# Patient Record
Sex: FEMALE | Race: WHITE
[De-identification: names, ages, dates, MRNs, and addresses within clinical notes are randomized per-mention and may not be internally consistent; named-entity substitution may affect disease eponyms.]

---
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
## 2017-01-12 NOTE — EKG REPORT
SEVERITY:- OTHERWISE NORMAL ECG -

SINUS RHYTHM

ATRIAL PREMATURE COMPLEX

:

Confirmed by: Mally Chavez MD 12-Jan-2017 09:43:32

## 2019-06-03 ENCOUNTER — HOSPITAL ENCOUNTER (EMERGENCY)
Dept: HOSPITAL 62 - ER | Age: 44
LOS: 1 days | Discharge: HOME | End: 2019-06-04
Payer: COMMERCIAL

## 2019-06-03 DIAGNOSIS — R06.02: ICD-10-CM

## 2019-06-03 DIAGNOSIS — N39.0: ICD-10-CM

## 2019-06-03 DIAGNOSIS — R00.2: ICD-10-CM

## 2019-06-03 DIAGNOSIS — D64.9: Primary | ICD-10-CM

## 2019-06-03 DIAGNOSIS — R53.83: ICD-10-CM

## 2019-06-03 PROCEDURE — 86850 RBC ANTIBODY SCREEN: CPT

## 2019-06-03 PROCEDURE — 87088 URINE BACTERIA CULTURE: CPT

## 2019-06-03 PROCEDURE — P9016 RBC LEUKOCYTES REDUCED: HCPCS

## 2019-06-03 PROCEDURE — 99283 EMERGENCY DEPT VISIT LOW MDM: CPT

## 2019-06-03 PROCEDURE — 81001 URINALYSIS AUTO W/SCOPE: CPT

## 2019-06-03 PROCEDURE — 86901 BLOOD TYPING SEROLOGIC RH(D): CPT

## 2019-06-03 PROCEDURE — 86920 COMPATIBILITY TEST SPIN: CPT

## 2019-06-03 PROCEDURE — 36430 TRANSFUSION BLD/BLD COMPNT: CPT

## 2019-06-03 PROCEDURE — 36415 COLL VENOUS BLD VENIPUNCTURE: CPT

## 2019-06-03 PROCEDURE — 87086 URINE CULTURE/COLONY COUNT: CPT

## 2019-06-03 PROCEDURE — 85025 COMPLETE CBC W/AUTO DIFF WBC: CPT

## 2019-06-03 PROCEDURE — 86900 BLOOD TYPING SEROLOGIC ABO: CPT

## 2019-06-03 PROCEDURE — 81025 URINE PREGNANCY TEST: CPT

## 2019-06-03 PROCEDURE — 80053 COMPREHEN METABOLIC PANEL: CPT

## 2019-06-04 VITALS — DIASTOLIC BLOOD PRESSURE: 70 MMHG | SYSTOLIC BLOOD PRESSURE: 133 MMHG

## 2019-06-04 LAB
%HYPO/RBC NFR BLD AUTO: (no result) %
%HYPO/RBC NFR BLD AUTO: (no result) %
ABSOLUTE LYMPHOCYTES# (MANUAL): 1 10^3/UL (ref 0.5–4.7)
ABSOLUTE MONOCYTES # (MANUAL): 0.6 10^3/UL (ref 0.1–1.4)
ABSOLUTE NEUTROPHILS# (MANUAL): 6.1 10^3/UL (ref 1.7–8.2)
ADD MANUAL DIFF: (no result)
ADD MANUAL DIFF: YES
ALBUMIN SERPL-MCNC: 4.9 G/DL (ref 3.5–5)
ALP SERPL-CCNC: 64 U/L (ref 38–126)
ALT SERPL-CCNC: 27 U/L (ref 9–52)
ANION GAP SERPL CALC-SCNC: 12 MMOL/L (ref 5–19)
ANISOCYTOSIS BLD QL SMEAR: (no result)
ANISOCYTOSIS BLD QL SMEAR: (no result)
APPEARANCE UR: (no result)
APTT PPP: YELLOW S
AST SERPL-CCNC: 26 U/L (ref 14–36)
BASOPHILS # BLD AUTO: 0.1 10^3/UL (ref 0–0.2)
BASOPHILS NFR BLD AUTO: 2.1 % (ref 0–2)
BASOPHILS NFR BLD MANUAL: 0 % (ref 0–2)
BILIRUB DIRECT SERPL-MCNC: 0.2 MG/DL (ref 0–0.4)
BILIRUB SERPL-MCNC: 0.3 MG/DL (ref 0.2–1.3)
BILIRUB UR QL STRIP: NEGATIVE
BUN SERPL-MCNC: 12 MG/DL (ref 7–20)
CALCIUM: 10.1 MG/DL (ref 8.4–10.2)
CHLORIDE SERPL-SCNC: 102 MMOL/L (ref 98–107)
CO2 SERPL-SCNC: 27 MMOL/L (ref 22–30)
DACRYOCYTES BLD QL SMEAR: SLIGHT
EOSINOPHIL # BLD AUTO: 0.1 10^3/UL (ref 0–0.6)
EOSINOPHIL NFR BLD AUTO: 1.8 % (ref 0–6)
EOSINOPHIL NFR BLD MANUAL: 4 % (ref 0–6)
ERYTHROCYTE [DISTWIDTH] IN BLOOD BY AUTOMATED COUNT: 22.4 % (ref 11.5–14)
ERYTHROCYTE [DISTWIDTH] IN BLOOD BY AUTOMATED COUNT: 29.6 % (ref 11.5–14)
GLUCOSE SERPL-MCNC: 93 MG/DL (ref 75–110)
GLUCOSE UR STRIP-MCNC: NEGATIVE MG/DL
HCT VFR BLD CALC: 26.4 % (ref 36–47)
HCT VFR BLD CALC: 27.2 % (ref 36–47)
HGB BLD-MCNC: 7.5 G/DL (ref 12–15.5)
HGB BLD-MCNC: 8 G/DL (ref 12–15.5)
KETONES UR STRIP-MCNC: NEGATIVE MG/DL
LYMPHOCYTES # BLD AUTO: 1.8 10^3/UL (ref 0.5–4.7)
LYMPHOCYTES NFR BLD AUTO: 30.6 % (ref 13–45)
MCH RBC QN AUTO: 15.8 PG (ref 27–33.4)
MCH RBC QN AUTO: 17.5 PG (ref 27–33.4)
MCHC RBC AUTO-ENTMCNC: 28.4 G/DL (ref 32–36)
MCHC RBC AUTO-ENTMCNC: 29.4 G/DL (ref 32–36)
MCV RBC AUTO: 56 FL (ref 80–97)
MCV RBC AUTO: 59 FL (ref 80–97)
MONOCYTES # BLD AUTO: 0.6 10^3/UL (ref 0.1–1.4)
MONOCYTES % (MANUAL): 7 % (ref 3–13)
MONOCYTES NFR BLD AUTO: 9.2 % (ref 3–13)
NEUTROPHILS # BLD AUTO: 3.4 10^3/UL (ref 1.7–8.2)
NEUTS SEG NFR BLD AUTO: 56.3 % (ref 42–78)
NITRITE UR QL STRIP: NEGATIVE
OVALOCYTES BLD QL SMEAR: (no result)
PATH REV BLD -IMP: (no result)
PH UR STRIP: 5 [PH] (ref 5–9)
PLATELET # BLD: 421 10^3/UL (ref 150–450)
PLATELET # BLD: 510 10^3/UL (ref 150–450)
PLATELET CLUMP BLD QL SMEAR: PRESENT
PLATELET COMMENT: (no result)
PLATELET COMMENT: ADEQUATE
POTASSIUM SERPL-SCNC: 4.2 MMOL/L (ref 3.6–5)
PROT SERPL-MCNC: 8.7 G/DL (ref 6.3–8.2)
PROT UR STRIP-MCNC: NEGATIVE MG/DL
RBC # BLD AUTO: 4.58 10^6/UL (ref 3.72–5.28)
RBC # BLD AUTO: 4.73 10^6/UL (ref 3.72–5.28)
SCHISTOCYTES BLD QL SMEAR: (no result)
SEGMENTED NEUTROPHILS % (MAN): 76 % (ref 42–78)
SODIUM SERPL-SCNC: 141.1 MMOL/L (ref 137–145)
SP GR UR STRIP: 1.02
STOMATOCYTES BLD QL SMEAR: (no result)
TARGETS BLD QL SMEAR: (no result)
TOTAL CELLS COUNTED % (AUTO): 100 %
TOTAL CELLS COUNTED BLD: 100
UROBILINOGEN UR-MCNC: 2 MG/DL (ref ?–2)
VARIANT LYMPHS NFR BLD MANUAL: 13 % (ref 13–45)
WBC # BLD AUTO: 6 10^3/UL (ref 4–10.5)
WBC # BLD AUTO: 8 10^3/UL (ref 4–10.5)

## 2019-06-04 NOTE — ER DOCUMENT REPORT
ED General





- General


Chief Complaint: Medical Complaint


Stated Complaint: ABNORMAL LABS


Time Seen by Provider: 06/04/19 03:15


Primary Care Provider: 


BOBBY BHAKTA FNP-C [Primary Care Provider] - Follow up as needed


TRAVEL OUTSIDE OF THE [place] IN LAST 30 DAYS: No





- HPI


Notes: 





Patient is a 43-year-old female that presents to the emergency department for 

chief complaint of anemia.


Patient reports history of chronic anemia from iron deficiency.  She has not 

been on iron supplementation in the last few years.  She states over the last 

year so she has had increased fatigue.  She also reports palpitations and 

dyspnea with light exertion and climbing stairs.  She was seen at her PCPs 

office today and was referred to the ER for blood transfusion.  She denies any 

active bleeding including menstrual cycle or blood in her stool.  She denies 

history of GI bleeding in the past.  She currently states that she feels tired 

but has no other issues.  She denies any associated chest pain, lightheadedness,

numbness, weakness and vision changes.  Patient denies recent illness fevers and

chills. 





Past Medical History: Iron deficiency anemia, anxiety





Past Surgical History: Reviewed in chart





Social History: Reviewed in chart





Family History: Reviewed and noncontributory for presenting illness





Allergies: Reviewed, see documented allergy list.








REVIEW OF SYSTEMS:





CONSTITUTIONAL : 





No fever





No chills





No diaphoresis





No recent illness 





fatigue





EENT:





No vision changes





No congestion





No sore throat  





CARDIOVASCULAR:





No chest pain





 palpitations





RESPIRATORY:





shortness of breath





No cough





No difficulty breathing





GASTROINTESTINAL: 





No abdominal pain





No nausea





No vomiting





No diarrhea





GENITOURINARY:





No dysuria





No hematuria





No difficulty urinating





MUSCULOSKELETAL:





No back pain





No leg pain





No arm pain





SKIN:  





No rashes





No lesions





LYMPHATIC: 





No swollen, enlarged glands.





NEUROLOGICAL: 





No lightheadedness





No headache





No weakness





No paresthesias





PSYCHIATRIC:





No anxiety





No depression 








PHYSICAL EXAMINATION:





Vital signs reviewed, nursing noted reviewed.





GENERAL: Well-appearing, well-nourished and in no acute distress.





HEAD: Atraumatic, normocephalic.





EYES: Eyes appear normal, extraocular movements intact, sclera anicteric, 

conjunctiva are mildly pale





ENT: nares patent, oropharynx clear without exudates.  Moist mucous membranes.





NECK: Normal range of motion, supple without lymphadenopathy





LUNGS: Breath sounds clear to auscultation bilaterally and equal.  No wheezes 

rales or rhonchi.





HEART: Regular rate and rhythm without murmurs





ABDOMEN: Soft, nontender, normoactive bowel sounds.  No rebound, guarding, or 

rigidity. No masses appreciated.





EXTREMITIES: Nontender, good range of motion, no pitting or edema. 





NEUROLOGICAL: No focal neurological deficits. Moves all extremities 

spontaneously Motor and sensory grossly intact on exam.





PSYCH: Normal mood, normal affect.





SKIN: Warm, Dry, normal turgor, no rashes or lesions noted on exposed skin











- Related Data


Allergies/Adverse Reactions: 


                                        





No Known Allergies Allergy (Unverified 01/11/17 13:37)


   











Past Medical History





- Social History


Smoking Status: Unknown if Ever Smoked


Family History: Reviewed & Not Pertinent


Patient has suicidal ideation: No


Patient has homicidal ideation: No





- Past Medical History


Cardiac Medical History: 


   Denies: Hx Atrial Fibrillation, Hx Coronary Artery Disease


Renal/ Medical History: Denies: Hx Peritoneal Dialysis


Psychiatric Medical History: Reports: Hx Anxiety





Physical Exam





- Vital signs


Vitals: 


                                        











Temp Pulse Resp BP Pulse Ox


 


 98 F   68   19   116/66   100 


 


 06/04/19 01:49  06/04/19 01:49  06/04/19 01:49  06/04/19 01:49  06/04/19 01:49














Course





- Re-evaluation


Re-evalutation: 





06/04/19 03:55


Vitals reviewed.  Nursing notes reviewed.  Patient is well-appearing in no acute

distress.  She has no report of GI hemorrhage including black or bloody stools. 

Patient has a history of iron deficiency anemia and her symptoms have been 

Getting progressively worse over the last few months.  Her anemia is likely rela

donna to iron deficiency and is chronic in nature.  She is otherwise 

hemodynamically stable with no active bleeding.  Patient will be transfused 1 

unit packed red blood cells for her anemia.  She was given Keflex for her acute 

UTI.  Urine culture has been sent.  She is not septic or ill-appearing.  Current

plan to discharge home after blood transfusion for outpatient follow-up if no 

issues with transfusion arise.  Patient in agreement with this plan of care.





Laboratory











  06/04/19 06/04/19 06/04/19





  01:25 01:25 01:25


 


WBC  8.0  


 


RBC  4.73  


 


Hgb  7.5 L  


 


Hct  26.4 L  


 


MCV  56 L  


 


MCH  15.8 L  


 


MCHC  28.4 L  


 


RDW  22.4 H  


 


Plt Count  510 H  


 


Total Counted  100  


 


Seg Neutrophils %  Not Reportable  


 


Seg Neuts % (Manual)  76  


 


Lymphocytes %  Not Reportable  


 


Lymphocytes % (Manual)  13  


 


Monocytes %  Not Reportable  


 


Monocytes % (Manual)  7  


 


Eosinophils %  Not Reportable  


 


Eosinophils % (Manual)  4  


 


Basophils %  Not Reportable  


 


Basophils % (Manual)  0  


 


Absolute Neutrophils  Not Reportable  


 


Abs Neuts (Manual)  6.1  


 


Absolute Lymphocytes  Not Reportable  


 


Abs Lymphs (Manual)  1.0  


 


Absolute Monocytes  Not Reportable  


 


Abs Monocytes (Manual)  0.6  


 


Absolute Eosinophils  Not Reportable  


 


Absolute Eos (Manual)  0.3  


 


Absolute Basophils  Not Reportable  


 


Abs Basophils (Manual)  0.0  


 


Clumped Platelets  PRESENT  


 


Platelet Comment  INCREASED  


 


Hypochromasia  3+  


 


Anisocytosis  3+  


 


Target Cells  1+  


 


Tear Drop Cells  SLIGHT  


 


Ovalocytes  1+  


 


Stomatocytes  1+  


 


Sodium   141.1 


 


Potassium   4.2 


 


Chloride   102 


 


Carbon Dioxide   27 


 


Anion Gap   12 


 


BUN   12 


 


Creatinine   0.72 


 


Est GFR ( Amer)   > 60 


 


Est GFR (Non-Af Amer)   > 60 


 


Glucose   93 


 


Calcium   10.1 


 


Total Bilirubin   0.3 


 


Direct Bilirubin   0.2 


 


Neonat Total Bilirubin   Not Reportable 


 


Neonat Direct Bilirubin   Not Reportable 


 


Neonat Indirect Bili   Not Reportable 


 


AST   26 


 


ALT   27 


 


Alkaline Phosphatase   64 


 


Total Protein   8.7 H 


 


Albumin   4.9 


 


Urine Color    YELLOW


 


Urine Appearance    SLIGHTLY-CLOUDY


 


Urine pH    5.0


 


Ur Specific Gravity    1.024


 


Urine Protein    NEGATIVE


 


Urine Glucose (UA)    NEGATIVE


 


Urine Ketones    NEGATIVE


 


Urine Blood    NEGATIVE


 


Urine Nitrite    NEGATIVE


 


Urine Bilirubin    NEGATIVE


 


Urine Urobilinogen    2.0 H


 


Ur Leukocyte Esterase    LARGE H


 


Urine WBC (Auto)    17


 


Urine RBC (Auto)    3


 


Urine Bacteria (Auto)    TRACE


 


Squamous Epi Cells Auto    7


 


Urine Mucus (Auto)    RARE


 


Urine Ascorbic Acid    NEGATIVE


 


Urine HCG, Qual    NEGATIVE


 


Crossmatch   














  06/04/19





  02:20


 


WBC 


 


RBC 


 


Hgb 


 


Hct 


 


MCV 


 


MCH 


 


MCHC 


 


RDW 


 


Plt Count 


 


Total Counted 


 


Seg Neutrophils % 


 


Seg Neuts % (Manual) 


 


Lymphocytes % 


 


Lymphocytes % (Manual) 


 


Monocytes % 


 


Monocytes % (Manual) 


 


Eosinophils % 


 


Eosinophils % (Manual) 


 


Basophils % 


 


Basophils % (Manual) 


 


Absolute Neutrophils 


 


Abs Neuts (Manual) 


 


Absolute Lymphocytes 


 


Abs Lymphs (Manual) 


 


Absolute Monocytes 


 


Abs Monocytes (Manual) 


 


Absolute Eosinophils 


 


Absolute Eos (Manual) 


 


Absolute Basophils 


 


Abs Basophils (Manual) 


 


Clumped Platelets 


 


Platelet Comment 


 


Hypochromasia 


 


Anisocytosis 


 


Target Cells 


 


Tear Drop Cells 


 


Ovalocytes 


 


Stomatocytes 


 


Sodium 


 


Potassium 


 


Chloride 


 


Carbon Dioxide 


 


Anion Gap 


 


BUN 


 


Creatinine 


 


Est GFR ( Amer) 


 


Est GFR (Non-Af Amer) 


 


Glucose 


 


Calcium 


 


Total Bilirubin 


 


Direct Bilirubin 


 


Neonat Total Bilirubin 


 


Neonat Direct Bilirubin 


 


Neonat Indirect Bili 


 


AST 


 


ALT 


 


Alkaline Phosphatase 


 


Total Protein 


 


Albumin 


 


Urine Color 


 


Urine Appearance 


 


Urine pH 


 


Ur Specific Gravity 


 


Urine Protein 


 


Urine Glucose (UA) 


 


Urine Ketones 


 


Urine Blood 


 


Urine Nitrite 


 


Urine Bilirubin 


 


Urine Urobilinogen 


 


Ur Leukocyte Esterase 


 


Urine WBC (Auto) 


 


Urine RBC (Auto) 


 


Urine Bacteria (Auto) 


 


Squamous Epi Cells Auto 


 


Urine Mucus (Auto) 


 


Urine Ascorbic Acid 


 


Urine HCG, Qual 


 


Crossmatch  See Detail














- Vital Signs


Vital signs: 


                                        











Temp Pulse Resp BP Pulse Ox


 


 98.2 F   72   18   133/70 H  98 


 


 06/04/19 11:01  06/04/19 06:50  06/04/19 11:01  06/04/19 11:01  06/04/19 11:01














- Laboratory


Result Diagrams: 


                                 06/04/19 09:32





                                 06/04/19 01:25


Laboratory results interpreted by me: 


                                        











  06/04/19 06/04/19 06/04/19





  01:25 01:25 01:25


 


Hgb  7.5 L  


 


Hct  26.4 L  


 


MCV  56 L  


 


MCH  15.8 L  


 


MCHC  28.4 L  


 


RDW  22.4 H  


 


Plt Count  510 H  


 


Basophils %   


 


Total Protein   8.7 H 


 


Urine Urobilinogen    2.0 H


 


Ur Leukocyte Esterase    LARGE H


 


Crossmatch   














  06/04/19 06/04/19





  02:20 09:32


 


Hgb   8.0 L


 


Hct   27.2 L


 


MCV   59 L


 


MCH   17.5 L


 


MCHC   29.4 L


 


RDW   29.6 H


 


Plt Count  


 


Basophils %   2.1 H


 


Total Protein  


 


Urine Urobilinogen  


 


Ur Leukocyte Esterase  


 


Crossmatch  See Detail 














Discharge





- Discharge


Clinical Impression: 


 Acute UTI





Anemia


Qualifiers:


 Anemia type: other cause Other causes of anemia: other cause, not classified 

Qualified Code(s): D64.89 - Other specified anemias





Condition: Stable


Disposition: HOME, SELF-CARE


Instructions:  Anemia ([facility]), Cephalexin ([facility]), Urinary Tract Infection ([facility])


Additional Instructions: 


Please return to the emergency department if you have any worsening, or concern 

of your symptoms.





Please return to the emergency department if you develop chest pain, difficulty 

breathing, severe abdominal pain, or ongoing vomiting.





Please follow-up with your primary care physician in 2-3 days and any other 

recommended physicians.





If prescribed, take all medications as directed. 





If you have any questions or concerns do not hesitate to return the emergency 

department for evaluation.





Have your hemoglobin rechecked by your primary care provider in the next 2 to 3 

days.  You will need to have further testing done to check your iron levels and 

likely be started on iron supplements by your primary care provider.





Return to the emergency room if you have any bleeding in your stool including 

black tarry stools.





Prescriptions: 


RX: Cephalexin Monohydrate [Keflex 500 mg Capsule] 500 mg PO Q6H 5 Days  capsule


Referrals: 


BOBBY BHAKTA FNP-C [Primary Care Provider] - Follow up as needed
18-Nov-2017 01:00

## 2019-10-28 ENCOUNTER — HOSPITAL ENCOUNTER (OUTPATIENT)
Dept: HOSPITAL 62 - OD | Age: 44
End: 2019-10-28
Attending: NURSE PRACTITIONER
Payer: COMMERCIAL

## 2019-10-28 ENCOUNTER — HOSPITAL ENCOUNTER (EMERGENCY)
Dept: HOSPITAL 62 - ER | Age: 44
LOS: 1 days | Discharge: HOME | End: 2019-10-29
Payer: COMMERCIAL

## 2019-10-28 DIAGNOSIS — D64.9: Primary | ICD-10-CM

## 2019-10-28 DIAGNOSIS — N93.9: ICD-10-CM

## 2019-10-28 DIAGNOSIS — R10.30: ICD-10-CM

## 2019-10-28 DIAGNOSIS — N92.0: Primary | ICD-10-CM

## 2019-10-28 DIAGNOSIS — F17.200: ICD-10-CM

## 2019-10-28 DIAGNOSIS — D25.9: ICD-10-CM

## 2019-10-28 LAB
ADD MANUAL DIFF: NO
ALBUMIN SERPL-MCNC: 3.8 G/DL (ref 3.5–5)
ALP SERPL-CCNC: 53 U/L (ref 38–126)
ANION GAP SERPL CALC-SCNC: 9 MMOL/L (ref 5–19)
AST SERPL-CCNC: 19 U/L (ref 14–36)
BASOPHILS # BLD AUTO: 0.1 10^3/UL (ref 0–0.2)
BASOPHILS NFR BLD AUTO: 1.7 % (ref 0–2)
BILIRUB DIRECT SERPL-MCNC: 0.1 MG/DL (ref 0–0.4)
BILIRUB SERPL-MCNC: < 0.1 MG/DL (ref 0.2–1.3)
BUN SERPL-MCNC: 12 MG/DL (ref 7–20)
CALCIUM: 8.9 MG/DL (ref 8.4–10.2)
CHLORIDE SERPL-SCNC: 106 MMOL/L (ref 98–107)
CO2 SERPL-SCNC: 25 MMOL/L (ref 22–30)
EOSINOPHIL # BLD AUTO: 0.2 10^3/UL (ref 0–0.6)
EOSINOPHIL NFR BLD AUTO: 3.8 % (ref 0–6)
ERYTHROCYTE [DISTWIDTH] IN BLOOD BY AUTOMATED COUNT: 15.9 % (ref 11.5–14)
ERYTHROCYTE [DISTWIDTH] IN BLOOD BY AUTOMATED COUNT: 16.1 % (ref 11.5–14)
GLUCOSE SERPL-MCNC: 100 MG/DL (ref 75–110)
HCT VFR BLD CALC: 21.5 % (ref 36–47)
HCT VFR BLD CALC: 22 % (ref 36–47)
HGB BLD-MCNC: 6.7 G/DL (ref 12–15.5)
HGB BLD-MCNC: 6.9 G/DL (ref 12–15.5)
LYMPHOCYTES # BLD AUTO: 1.6 10^3/UL (ref 0.5–4.7)
LYMPHOCYTES NFR BLD AUTO: 27.3 % (ref 13–45)
MCH RBC QN AUTO: 26.8 PG (ref 27–33.4)
MCH RBC QN AUTO: 27.2 PG (ref 27–33.4)
MCHC RBC AUTO-ENTMCNC: 31.2 G/DL (ref 32–36)
MCHC RBC AUTO-ENTMCNC: 31.3 G/DL (ref 32–36)
MCV RBC AUTO: 86 FL (ref 80–97)
MCV RBC AUTO: 87 FL (ref 80–97)
MONOCYTES # BLD AUTO: 0.7 10^3/UL (ref 0.1–1.4)
MONOCYTES NFR BLD AUTO: 11.2 % (ref 3–13)
NEUTROPHILS # BLD AUTO: 3.3 10^3/UL (ref 1.7–8.2)
NEUTS SEG NFR BLD AUTO: 56 % (ref 42–78)
PLATELET # BLD: 354 10^3/UL (ref 150–450)
PLATELET # BLD: 367 10^3/UL (ref 150–450)
POTASSIUM SERPL-SCNC: 4 MMOL/L (ref 3.6–5)
PROT SERPL-MCNC: 6.6 G/DL (ref 6.3–8.2)
RBC # BLD AUTO: 2.47 10^6/UL (ref 3.72–5.28)
RBC # BLD AUTO: 2.57 10^6/UL (ref 3.72–5.28)
TOTAL CELLS COUNTED % (AUTO): 100 %
WBC # BLD AUTO: 5.9 10^3/UL (ref 4–10.5)
WBC # BLD AUTO: 7 10^3/UL (ref 4–10.5)

## 2019-10-28 PROCEDURE — 80053 COMPREHEN METABOLIC PANEL: CPT

## 2019-10-28 PROCEDURE — 36415 COLL VENOUS BLD VENIPUNCTURE: CPT

## 2019-10-28 PROCEDURE — 86920 COMPATIBILITY TEST SPIN: CPT

## 2019-10-28 PROCEDURE — P9016 RBC LEUKOCYTES REDUCED: HCPCS

## 2019-10-28 PROCEDURE — 93976 VASCULAR STUDY: CPT

## 2019-10-28 PROCEDURE — 86850 RBC ANTIBODY SCREEN: CPT

## 2019-10-28 PROCEDURE — 85027 COMPLETE CBC AUTOMATED: CPT

## 2019-10-28 PROCEDURE — 86901 BLOOD TYPING SEROLOGIC RH(D): CPT

## 2019-10-28 PROCEDURE — 84703 CHORIONIC GONADOTROPIN ASSAY: CPT

## 2019-10-28 PROCEDURE — 96374 THER/PROPH/DIAG INJ IV PUSH: CPT

## 2019-10-28 PROCEDURE — 99284 EMERGENCY DEPT VISIT MOD MDM: CPT

## 2019-10-28 PROCEDURE — 85025 COMPLETE CBC W/AUTO DIFF WBC: CPT

## 2019-10-28 PROCEDURE — 86900 BLOOD TYPING SEROLOGIC ABO: CPT

## 2019-10-28 PROCEDURE — 36430 TRANSFUSION BLD/BLD COMPNT: CPT

## 2019-10-28 PROCEDURE — 76830 TRANSVAGINAL US NON-OB: CPT

## 2019-10-28 NOTE — ER DOCUMENT REPORT
ED GI/





- General


Chief Complaint: Abnormal Lab Results


Stated Complaint: ABNORMAL LAB WORK


Time Seen by Provider: 10/28/19 20:27


Primary Care Provider: 


[facility] [Provider Group] - Follow up as needed


Notes: 





Patient is a 44-year-old female that comes emergency department for chief 

complaint of abnormal labs.  She states she had a hemoglobin that was measured 

earlier at 6.7, she also had an ultrasound.  She is being evaluated because she 

has frequent heavy vaginal bleeding.  She states she started bleeding 2 days 

ago, yesterday was very heavy with changing through 2 pads an hour, today she is

only changing one pad every 2 hours.  She denies dizziness or passing out.  She 

reports occasional lower abdominal cramping.  She states she is on no daily 

medications.


TRAVEL OUTSIDE OF THE [place] IN LAST 30 DAYS: No





- Related Data


Allergies/Adverse Reactions: 


                                        





No Known Allergies Allergy (Unverified 01/11/17 13:37)


   











Past Medical History





- General


Information source: Patient





- Social History


Smoking Status: Current Every Day Smoker


Chew tobacco use (# tins/day): No


Frequency of alcohol use: Rare


Drug Abuse: None


Lives with: Family


Family History: Reviewed & Not Pertinent


Patient has suicidal ideation: No


Patient has homicidal ideation: No





- Past Medical History


Cardiac Medical History: 


   Denies: Hx Atrial Fibrillation, Hx Coronary Artery Disease


Renal/ Medical History: Denies: Hx Peritoneal Dialysis


Psychiatric Medical History: Reports: Hx Anxiety





- Immunizations


Immunizations up to date: Yes


Hx Diphtheria, Pertussis, Tetanus Vaccination: Yes





Review of Systems





- Review of Systems


Constitutional: No symptoms reported


EENT: No symptoms reported


Cardiovascular: See HPI


Respiratory: No symptoms reported


Gastrointestinal: No symptoms reported


Genitourinary: No symptoms reported


Female Genitourinary: See HPI


Musculoskeletal: No symptoms reported


Skin: No symptoms reported


Hematologic/Lymphatic: No symptoms reported


Neurological/Psychological: No symptoms reported





Physical Exam





- Vital signs


Vitals: 


                                        











Temp Pulse Resp BP Pulse Ox


 


 98.0 F   89   18   119/57 L  100 


 


 10/28/19 20:26  10/28/19 20:26  10/28/19 20:26  10/28/19 20:26  10/28/19 20:26














- Notes


Notes: 





GENERAL: Alert, interacts well. No acute distress.


HEAD: Normocephalic, atraumatic.


EYES: Pupils equal, round, and reactive to light. Extraocular movements intact.


ENT: Oral mucosa moist, tongue midline. Oropharynx unremarkable. Airway patent. 


LUNGS: Clear to auscultation bilaterally, no wheezes, rales, or rhonchi. No 

respiratory distress.


HEART: Regular rate and rhythm. No murmur


ABDOMEN: Soft, non-tender. Non-distended. Bowel sounds present in all 4 

quadrants.


GENITOURINARY: There is some blood pooled in the vaginal vault but there is no 

noted current bleeding coming from the cervix.  No tenderness or discharge.  No 

external findings that are concerning.  Exam performed with Viv ROBERSON at 

bedside.


EXTREMITIES: Moves all 4 extremities spontaneously. No edema, normal radial and 

dorsalis pedis pulses bilaterally. No cyanosis.


BACK: no cervical, thoracic, lumbar midline tenderness. No saddle anesthesia, 

normal distal neurovascular exam. Moves all extremities in full range of motion.


NEUROLOGICAL: Alert and oriented x3. Normal speech. Cranial nerves II through 

XII grossly intact. 


PSYCH: Normal affect, normal mood.


SKIN: Warm, dry, normal turgor. No rashes or lesions noted.





Course





- Re-evaluation


Re-evalutation: 


Patient is well-appearing on my exam, soft abdomen, she is not tachycardic or 

hypotensive.  Pelvic exam was performed and shows some previous bleeding but I 

do not actually even see any current bleeding.  Recent ultrasound was reviewed 

and this shows fibroid uterus most likely without any concerning findings 

otherwise.  This was performed today.  CBC does show notable anemia with 

hemoglobin of 6.7.  She will be transfused.  No leukocytosis or fever.





Patient was transfused 2 units of packed red blood cells.  She tolerated this 

very well.  She did not have any additional bleeding reported.  She did not 

require additional interventions in regards to this.  CBC was repeated and 

hemoglobin is now 9.2.  Patient with no complaints on reevaluation.  She states 

she has an appointment with the OB/GYN at the office this morning.  She states 

she will go there without fail.  Discussed return precautions.  Patient states 

appreciation and agreement.  Stable at time of discharge.





- Vital Signs


Vital signs: 


                                        











Temp Pulse Resp BP Pulse Ox


 


 99.1 F   60   16   106/63   99 


 


 10/29/19 03:30  10/29/19 04:30  10/29/19 06:58  10/29/19 06:58  10/29/19 06:58














- Laboratory


Result Diagrams: 


                                 10/29/19 05:29





                                 10/28/19 21:35


Laboratory results interpreted by me: 


                                        











  10/28/19 10/28/19 10/28/19





  21:35 21:35 21:35


 


RBC    2.47 L


 


Hgb    6.7 L


 


Hct    21.5 L


 


MCHC    31.2 L


 


RDW    15.9 H


 


Total Bilirubin  < 0.1 L  


 


Crossmatch   See Detail 














  10/29/19





  05:29


 


RBC  3.30 L


 


Hgb  9.2 L D


 


Hct  28.3 L


 


MCHC 


 


RDW  15.1 H


 


Total Bilirubin 


 


Crossmatch 














Discharge





- Discharge


Clinical Impression: 


 Anemia requiring transfusions, Vaginal bleeding





Condition: Stable


Disposition: HOME, SELF-CARE


Additional Instructions: 


Your transfusion showed good results.  The bleeding appears to be from a fibroid

uterus.  Please follow-up with OB/GYN later today for additional management of 

this.  Come back if you worsen including heavy bleeding, severe pain, passing 

out, fever, or any other concerning symptoms.


Referrals: 


[facility] [Provider Group] - Follow up as needed

## 2019-10-28 NOTE — RADIOLOGY REPORT (SQ)
EXAM DESCRIPTION: 



US PELVIS TRANSVAGINAL



COMPLETED DATE/TME:  10/28/2019 20:34



CLINICAL HISTORY: 



44 years, Female, vaginal bleeding



COMPARISON:

None.



TECHNIQUE:

Emergent transvaginal pelvic ultrasound



LIMITATIONS:

None.



FINDINGS:



The uterus measures 10.7 x 5.8 x 7.4 cm. Diffusely heterogeneous

echotexture to the uterine myometrium. The endometrium measures 9

mm in thickness. The right ovary is not well seen due to bowel

gas. The left ovary measures 2.5 x 1.7 x 2.4 cm. Arterial and

venous flow to the left ovary. No adnexal cyst or mass. No free

fluid



IMPRESSION:



Heterogeneous echotexture to the uterine myometrium could reflect

fibroid change.



Suboptimal visualization of the right ovary.



Remainder unremarkable

 



copyright 2011 [employer]- All Rights Reserved

## 2019-10-28 NOTE — ER DOCUMENT REPORT
ED Medical Screen (RME)





- General


Chief Complaint: Abnormal Lab Results


Stated Complaint: ABNORMAL LAB WORK


Time Seen by Provider: 10/28/19 20:27


Primary Care Provider: 


BOBBY BHAKTA FNP-C [Primary Care Provider] - Follow up as needed


Notes: 





Patient is a 44-year-old female who presents emergency department with a chief 

complaint of vaginal bleeding.  She saw her primary care provider earlier today 

and had labs drawn.  Her hemoglobin was 6.9 and she was referred here to the 

emergency department.  Patient states that she started bleeding 2 days ago and 

yesterday she was going through to super sized pads an hour.  Patient admits to 

irregular periods.  Patient states that she feels like she has cramping, but 

denies any actual pain.





Exam: Soft mildly tender mid lower abdomen.





I have greeted and performed a rapid initial assessment of this patient.  A 

comprehensive ED assessment and evaluation of the patient, analysis of test 

results and completion of medical decision making process will be conducted by 

an additional ED providers.





TRAVEL OUTSIDE OF THE [place] IN LAST 30 DAYS: No





- Related Data


Allergies/Adverse Reactions: 


                                        





No Known Allergies Allergy (Unverified 01/11/17 13:37)


   











Past Medical History





- Social History


Chew tobacco use (# tins/day): No


Frequency of alcohol use: Rare


Drug Abuse: None





- Past Medical History


Cardiac Medical History: 


   Denies: Hx Atrial Fibrillation, Hx Coronary Artery Disease


Renal/ Medical History: Denies: Hx Peritoneal Dialysis


Psychiatric Medical History: Reports: Hx Anxiety





Physical Exam





- Vital signs


Vitals: 





                                        











Temp Pulse Resp BP Pulse Ox


 


 98.0 F   89   18   119/57 L  100 


 


 10/28/19 20:26  10/28/19 20:26  10/28/19 20:26  10/28/19 20:26  10/28/19 20:26














Course





- Vital Signs


Vital signs: 





                                        











Temp Pulse Resp BP Pulse Ox


 


 98.0 F   89   18   119/57 L  100 


 


 10/28/19 20:26  10/28/19 20:26  10/28/19 20:26  10/28/19 20:26  10/28/19 20:26














Doctor's Discharge





- Discharge


Referrals: 


BOBBY BHAKTA FNP-C [Primary Care Provider] - Follow up as needed

## 2019-10-29 VITALS — SYSTOLIC BLOOD PRESSURE: 106 MMHG | DIASTOLIC BLOOD PRESSURE: 63 MMHG

## 2019-10-29 LAB
ERYTHROCYTE [DISTWIDTH] IN BLOOD BY AUTOMATED COUNT: 15.1 % (ref 11.5–14)
HCT VFR BLD CALC: 28.3 % (ref 36–47)
HGB BLD-MCNC: 9.2 G/DL (ref 12–15.5)
MCH RBC QN AUTO: 28 PG (ref 27–33.4)
MCHC RBC AUTO-ENTMCNC: 32.6 G/DL (ref 32–36)
MCV RBC AUTO: 86 FL (ref 80–97)
PLATELET # BLD: 276 10^3/UL (ref 150–450)
RBC # BLD AUTO: 3.3 10^6/UL (ref 3.72–5.28)
WBC # BLD AUTO: 5.7 10^3/UL (ref 4–10.5)